# Patient Record
Sex: FEMALE | Race: WHITE | NOT HISPANIC OR LATINO | URBAN - METROPOLITAN AREA
[De-identification: names, ages, dates, MRNs, and addresses within clinical notes are randomized per-mention and may not be internally consistent; named-entity substitution may affect disease eponyms.]

---

## 2022-05-26 ENCOUNTER — EMERGENCY (EMERGENCY)
Facility: HOSPITAL | Age: 62
LOS: 0 days | Discharge: HOME | End: 2022-05-26
Attending: EMERGENCY MEDICINE
Payer: COMMERCIAL

## 2022-05-26 VITALS
SYSTOLIC BLOOD PRESSURE: 96 MMHG | DIASTOLIC BLOOD PRESSURE: 52 MMHG | OXYGEN SATURATION: 97 % | RESPIRATION RATE: 14 BRPM | TEMPERATURE: 99 F | HEART RATE: 72 BPM

## 2022-05-26 VITALS — WEIGHT: 119.93 LBS | HEIGHT: 64 IN
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
## 2022-05-26 DIAGNOSIS — S83.92XA SPRAIN OF UNSPECIFIED SITE OF LEFT KNEE, INITIAL ENCOUNTER: ICD-10-CM
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
## 2022-05-26 DIAGNOSIS — M25.562 PAIN IN LEFT KNEE: ICD-10-CM
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
## 2022-05-26 PROCEDURE — 99284 EMERGENCY DEPT VISIT MOD MDM: CPT

## 2022-05-26 PROCEDURE — 99284 EMERGENCY DEPT VISIT MOD MDM: CPT | Mod: 25

## 2022-05-26 PROCEDURE — 93971 EXTREMITY STUDY: CPT | Mod: LT

## 2022-05-26 PROCEDURE — 73562 X-RAY EXAM OF KNEE 3: CPT | Mod: 26,LT

## 2022-05-26 PROCEDURE — 93971 EXTREMITY STUDY: CPT | Mod: 26,LT

## 2022-05-26 PROCEDURE — 73562 X-RAY EXAM OF KNEE 3: CPT | Mod: LT

## 2022-05-26 NOTE — ED STATDOCS - PATIENT PORTAL LINK FT
You can access the FollowMyHealth Patient Portal offered by [facility] by registering at the following website: [email or web address]. By joining [facility]’s FollowMyHealth portal, you will also be able to view your health information using other applications (apps) compatible with our system.

## 2022-05-26 NOTE — ED STATDOCS - OBJECTIVE STATEMENT
63 y/o female presents to the ED c/o left knee pain x few days. Pt reports she initially felt some pain this week while standing up from sitting at her desk at work, however today, pt states she was limping more [place], heard a pop, and has been unable to ambulate or bear weight on left leg since. Denies other injury or symptoms. No other complaints at this time.

## 2022-05-26 NOTE — ED STATDOCS - NS ED ATTENDING STATEMENT MOD
This was a shared visit with the LEAH. I reviewed and verified the documentation and independently performed the documented:

## 2022-05-26 NOTE — ED STATDOCS - NSFOLLOWUPINSTRUCTIONS_ED_ALL_ED_FT
ICE FOR 15-20 MINUTES AT A TIME. ACE WRAP AND IMMOBILIZER AT ALL TIMES. KEEP LEG ELEVATED WHEN RESTING. MOTRIN AND TYLENOL AS NEEDED FOR PAIN. FOLLOW UP WITH ORTHOPEDICS UPON RETURN HOME.     Elastic Bandage and RICE Therapy    Elastic bandages come in different shapes and sizes. They generally provide support to your injury and reduce swelling while you are healing, but they can perform different functions. Your health care provider will help you to decide what is best for your protection, recovery, or rehabilitation after an injury.  The routine care of many injuries includes rest, ice, compression, and elevation (RICE therapy). RICE therapy is often recommended for injuries to soft tissues, such as muscle strain, sprains, bruises, and overuse injuries. It can also be used for some bone injuries. Using RICE therapy can help to relieve pain and lessen swelling.  What are some general tips for using an elastic bandage?  Use the bandage as directed by the maker of the bandage that you are using.Do not wrap the bandage too tightly. This may block (cut off) the circulation in the arm or leg in the area below the bandage.  If part of your body beyond the bandage becomes blue, numb, cold, swollen, or more painful, your bandage is probably too tight. If this occurs, remove your bandage and reapply it more loosely.Remove and reapply an elastic bandage every 3–4 hours or as told by your health care provider.See your health care provider if the bandage seems to be making your problems worse rather than better.How to care for your injury with RICE therapy  Rest    Rest your injury. This may help with the healing process. Rest usually involves limiting your normal activities and not using the injured part of your body. Generally, you can return to your normal activities when your health care provider says it is okay and you can do them without much discomfort.  If you rest the injury too much, it may not heal as well. Some injuries heal better with early movement instead of resting for too long. Talk with your health care provider about how you should limit your activities and whether you should start range-of-motion exercises for your injury.  Ice    Ice your injury to lessen swelling and pain. Do not apply ice directly to your skin.  Put ice in a plastic bag.Place a towel between your skin and the bag.Leave the ice on for 20 minutes, 2–3 times a day. Use ice on as many days as told by your health care provider.  Compression    Put pressure (compression) on your injured area to control swelling, give support, and help with discomfort. Compression may be done with an elastic bandage.  Elevation    Raise (elevate) your injured area to lessen swelling and pain. If possible, elevate your injured area at or above the level of your heart or the center of your chest.  Contact a health care provider if:  Your pain and swelling continue.Your symptoms are getting worse rather than improving.Having these problems may mean that you need further evaluation or imaging tests, such as X-rays or an MRI. Sometimes, X-rays may not show a small broken bone (fracture) until days after the injury happened. Make a follow-up appointment with your health care provider. Ask your health care provider, or the department that is doing the imaging test, when your results will be ready.  Get help right away if:  You have sudden severe pain at or below the area of your injury.You have redness or increased swelling around your injury.You have tingling or numbness at or below the area of your injury and it does not improve after you remove the elastic bandage.Summary  Elastic bandages provide support to your injury and reduce swelling while you are healing. Your health care provider will help you decide which type of elastic bandage is best for your injury.Do not wrap the bandage too tightly. This may block (cut off) the circulation in the arm or leg in the area below the bandage.Putting pressure (compression) on your injured area with an elastic bandage is part of RICE therapy. RICE therapy includes rest, ice, compression, and elevation. This treatment is recommended for the routine care of many injuries.This information is not intended to replace advice given to you by your health care provider. Make sure you discuss any questions you have with your health care provider.

## 2022-05-26 NOTE — ED STATDOCS - CLINICAL SUMMARY MEDICAL DECISION MAKING FREE TEXT BOX
XR and US WNL.  Diagnosis left knee sprain.  Recommend RICE therapy, f/u with orthopedics.  Return precautions given.

## 2022-05-26 NOTE — ED ADULT TRIAGE NOTE - CHIEF COMPLAINT QUOTE
Pt brought to the ED complaining of left knee pain. Pt just got off an airplane and while walking she felt a large pop behind her left knee. Pt unable to put pressure on left leg.

## 2022-05-26 NOTE — ED STATDOCS - PROGRESS NOTE DETAILS
Unable to leave wet read, no fracture or dislocation noted on knee xray. - Rakan Burton PA-C Patient placed in immobilizer. States she will follow up with her orthopedist upon returning to [place]. - Rakan Burton PA-C 63 y/o F with no PMH presents with left knee pain worse over the past 2-3 days. Pt states she was walking at the airport today and heard a "pop" in her knee. Has pain in posterior left knee. Has been unable to bear weight since that time. Reports she had some pain last week while at work when going from sit to stand. Pt is visiting from [place]. No other reported injury. PE: Well appearing. Cardiac: s1s2, RRR. lungs: CTAb. Abdomen: NBS x4, soft, nontender. MSK: +left knee effusion. Valgus/varus stress negative. +pain with anterior draw. Negative posterior draw. Sensation intact to light touch in lower extremities. 5/5 LE strength. A/P: Likely ligamentous injury. Will XR to r/o fracture, US to evaluate for Baker's cyst & DVT. Likely splint and dc home. - Rakan Burton PA-C

## 2022-05-26 NOTE — ED PROCEDURE NOTE - CPROC ED POST RADIOGRAPHY1
Discharge Call Back      Person Contacted: patient    Hi, this is BILLIE Almonte calling from [facility]'s Emergency Room.    I wanted to call you to see how Heidi is doing after her recent visit to the ER.     Patient Condition: Improved    Did your discharge instructions answer all your questions yes    If applicable, have you made a follow-up appointment or received a call for follow up? Yes; with orthopedics.    Do you have any questions about your care in the Emergency Room, pain control or discharge instructions? no        
post procedure radiography not performed